# Patient Record
Sex: MALE | Employment: UNEMPLOYED | URBAN - METROPOLITAN AREA
[De-identification: names, ages, dates, MRNs, and addresses within clinical notes are randomized per-mention and may not be internally consistent; named-entity substitution may affect disease eponyms.]

---

## 2023-12-07 ENCOUNTER — APPOINTMENT (OUTPATIENT)
Dept: GENERAL RADIOLOGY | Age: 53
End: 2023-12-07

## 2023-12-07 ENCOUNTER — HOSPITAL ENCOUNTER (OUTPATIENT)
Age: 53
Setting detail: OBSERVATION
Discharge: HOME | End: 2023-12-07
Attending: EMERGENCY MEDICINE | Admitting: SURGERY

## 2023-12-07 VITALS
BODY MASS INDEX: 26.41 KG/M2 | HEART RATE: 55 BPM | TEMPERATURE: 98.8 F | SYSTOLIC BLOOD PRESSURE: 175 MMHG | RESPIRATION RATE: 18 BRPM | HEIGHT: 72 IN | OXYGEN SATURATION: 99 % | WEIGHT: 195 LBS | DIASTOLIC BLOOD PRESSURE: 88 MMHG

## 2023-12-07 DIAGNOSIS — S02.85XA ORBITAL WALL FRACTURE, CLOSED, INITIAL ENCOUNTER (HCC): ICD-10-CM

## 2023-12-07 DIAGNOSIS — S02.85XA CLOSED FRACTURE OF ORBIT, INITIAL ENCOUNTER (HCC): ICD-10-CM

## 2023-12-07 DIAGNOSIS — S02.40CA CLOSED FRACTURE OF RIGHT SIDE OF MAXILLA, INITIAL ENCOUNTER (HCC): ICD-10-CM
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
## 2023-12-07 LAB
ABO + RH BLD: NORMAL
ANION GAP SERPL CALCULATED.3IONS-SCNC: 8 MMOL/L (ref 9–17)
ARM BAND NUMBER: NORMAL
ARTERIAL PATENCY WRIST A: ABNORMAL
BDY SITE: ABNORMAL
BLOOD BANK SAMPLE EXPIRATION: NORMAL
BLOOD BANK SPECIMEN: ABNORMAL
BLOOD GROUP ANTIBODIES SERPL: NEGATIVE
BODY TEMPERATURE: ABNORMAL
BREATHS.MECHANICAL ON VENT: ABNORMAL
BUN SERPL-MCNC: 10 MG/DL (ref 6–20)
CHLORIDE SERPL-SCNC: 105 MMOL/L (ref 98–107)
CO2 SERPL-SCNC: 26 MMOL/L (ref 20–31)
COHGB MFR BLD: ABNORMAL %
CREAT SERPL-MCNC: 1.2 MG/DL (ref 0.7–1.2)
ERYTHROCYTE [DISTWIDTH] IN BLOOD BY AUTOMATED COUNT: 14 % (ref 11.8–14.4)
ETHANOL PERCENT: <0.01 %
ETHANOLAMINE SERPL-MCNC: <10 MG/DL
FIO2 ON VENT: ABNORMAL %
GAS FLOW.O2 O2 DELIVERY SYS: ABNORMAL L/MIN
GAS FLOW.O2 SETTING OXYMISER: ABNORMAL L/MIN
GFR SERPL CREATININE-BSD FRML MDRD: >60 ML/MIN/1.73M2
GLUCOSE SERPL-MCNC: 87 MG/DL (ref 70–99)
HCG SERPL QL: ABNORMAL
HCO3 VENOUS: ABNORMAL MMOL/L (ref 24–30)
HCT VFR BLD AUTO: 42.3 % (ref 40.7–50.3)
HGB BLD-MCNC: 14.4 G/DL (ref 13–17)
HGB BLDMV-MCNC: ABNORMAL G/DL (ref 12–16)
INR PPP: 1.1
MCH RBC QN AUTO: 30 PG (ref 25.2–33.5)
MCHC RBC AUTO-ENTMCNC: 34 G/DL (ref 28.4–34.8)
MCV RBC AUTO: 88.1 FL (ref 82.6–102.9)
METHEMOGLOBIN: ABNORMAL %
NEGATIVE BASE EXCESS, VEN: ABNORMAL MMOL/L (ref 0–2)
NOTIFICATION TIME: ABNORMAL
NOTIFICATION: ABNORMAL
NRBC BLD-RTO: 0 PER 100 WBC
O2 SAT, VEN: ABNORMAL %
OXYHGB MFR BLD: ABNORMAL % (ref 95–98)
PARTIAL THROMBOPLASTIN TIME: 31.2 SEC (ref 23–36.5)
PCO2, VEN, TEMP ADJ: ABNORMAL MMHG (ref 39–55)
PCO2, VEN: ABNORMAL MM HG (ref 39–55)
PEEP RESPIRATORY: ABNORMAL CM[H2O]
PH VENOUS: ABNORMAL (ref 7.32–7.42)
PH, VEN, TEMP ADJ: ABNORMAL (ref 7.32–7.42)
PLATELET # BLD AUTO: 249 K/UL (ref 138–453)
PMV BLD AUTO: 9.1 FL (ref 8.1–13.5)
PO2, VEN, TEMP ADJ: ABNORMAL MMHG (ref 30–50)
PO2, VEN: ABNORMAL MM HG (ref 30–50)
POSITIVE BASE EXCESS, VEN: ABNORMAL MMOL/L (ref 0–2)
POTASSIUM SERPL-SCNC: 4.3 MMOL/L (ref 3.7–5.3)
PROTHROMBIN TIME: 14.2 SEC (ref 11.7–14.9)
PT. POSITION: ABNORMAL
RBC # BLD AUTO: 4.8 M/UL (ref 4.21–5.77)
RESPIRATORY RATE: ABNORMAL
SODIUM SERPL-SCNC: 139 MMOL/L (ref 135–144)
TEXT FOR RESPIRATORY: ABNORMAL
TOTAL RATE: ABNORMAL
VENTILATION MODE VENT: ABNORMAL
VT: ABNORMAL
WBC OTHER # BLD: 8.6 K/UL (ref 3.5–11.3)

## 2023-12-07 PROCEDURE — 80051 ELECTROLYTE PANEL: CPT

## 2023-12-07 PROCEDURE — 99285 EMERGENCY DEPT VISIT HI MDM: CPT

## 2023-12-07 PROCEDURE — 86901 BLOOD TYPING SEROLOGIC RH(D): CPT

## 2023-12-07 PROCEDURE — G0378 HOSPITAL OBSERVATION PER HR: HCPCS

## 2023-12-07 PROCEDURE — 82947 ASSAY GLUCOSE BLOOD QUANT: CPT

## 2023-12-07 PROCEDURE — 85027 COMPLETE CBC AUTOMATED: CPT

## 2023-12-07 PROCEDURE — 84703 CHORIONIC GONADOTROPIN ASSAY: CPT

## 2023-12-07 PROCEDURE — 84520 ASSAY OF UREA NITROGEN: CPT

## 2023-12-07 PROCEDURE — 71045 X-RAY EXAM CHEST 1 VIEW: CPT

## 2023-12-07 PROCEDURE — G0480 DRUG TEST DEF 1-7 CLASSES: HCPCS

## 2023-12-07 PROCEDURE — 2580000003 HC RX 258: Performed by: STUDENT IN AN ORGANIZED HEALTH CARE EDUCATION/TRAINING PROGRAM

## 2023-12-07 PROCEDURE — 6370000000 HC RX 637 (ALT 250 FOR IP): Performed by: STUDENT IN AN ORGANIZED HEALTH CARE EDUCATION/TRAINING PROGRAM

## 2023-12-07 PROCEDURE — 82805 BLOOD GASES W/O2 SATURATION: CPT

## 2023-12-07 PROCEDURE — 85610 PROTHROMBIN TIME: CPT

## 2023-12-07 PROCEDURE — 86900 BLOOD TYPING SEROLOGIC ABO: CPT

## 2023-12-07 PROCEDURE — 85730 THROMBOPLASTIN TIME PARTIAL: CPT

## 2023-12-07 PROCEDURE — 99281 EMR DPT VST MAYX REQ PHY/QHP: CPT | Performed by: SURGERY

## 2023-12-07 PROCEDURE — 82565 ASSAY OF CREATININE: CPT

## 2023-12-07 PROCEDURE — 86850 RBC ANTIBODY SCREEN: CPT

## 2023-12-07 RX ORDER — ACETAMINOPHEN 325 MG/1
650 TABLET ORAL EVERY 8 HOURS SCHEDULED
Status: DISCONTINUED | OUTPATIENT
Start: 2023-12-07 | End: 2023-12-07 | Stop reason: HOSPADM

## 2023-12-07 RX ORDER — ONDANSETRON 2 MG/ML
4 INJECTION INTRAMUSCULAR; INTRAVENOUS EVERY 6 HOURS PRN
Status: DISCONTINUED | OUTPATIENT
Start: 2023-12-07 | End: 2023-12-07 | Stop reason: HOSPADM

## 2023-12-07 RX ORDER — GABAPENTIN 100 MG/1
100 CAPSULE ORAL EVERY 8 HOURS
Status: DISCONTINUED | OUTPATIENT
Start: 2023-12-07 | End: 2023-12-07 | Stop reason: HOSPADM

## 2023-12-07 RX ORDER — SODIUM CHLORIDE 0.9 % (FLUSH) 0.9 %
5-40 SYRINGE (ML) INJECTION EVERY 12 HOURS SCHEDULED
Status: DISCONTINUED | OUTPATIENT
Start: 2023-12-07 | End: 2023-12-07 | Stop reason: HOSPADM

## 2023-12-07 RX ORDER — AMOXICILLIN AND CLAVULANATE POTASSIUM 600; 42.9 MG/5ML; MG/5ML
800 POWDER, FOR SUSPENSION ORAL 2 TIMES DAILY
Status: CANCELLED | OUTPATIENT
Start: 2023-12-07

## 2023-12-07 RX ORDER — SODIUM CHLORIDE, SODIUM LACTATE, POTASSIUM CHLORIDE, AND CALCIUM CHLORIDE .6; .31; .03; .02 G/100ML; G/100ML; G/100ML; G/100ML
1000 INJECTION, SOLUTION INTRAVENOUS ONCE
Status: COMPLETED | OUTPATIENT
Start: 2023-12-07 | End: 2023-12-07

## 2023-12-07 RX ORDER — SODIUM CHLORIDE 0.9 % (FLUSH) 0.9 %
5-40 SYRINGE (ML) INJECTION PRN
Status: DISCONTINUED | OUTPATIENT
Start: 2023-12-07 | End: 2023-12-07 | Stop reason: HOSPADM

## 2023-12-07 RX ORDER — SODIUM CHLORIDE, SODIUM LACTATE, POTASSIUM CHLORIDE, CALCIUM CHLORIDE 600; 310; 30; 20 MG/100ML; MG/100ML; MG/100ML; MG/100ML
INJECTION, SOLUTION INTRAVENOUS CONTINUOUS
Status: DISCONTINUED | OUTPATIENT
Start: 2023-12-07 | End: 2023-12-07 | Stop reason: HOSPADM

## 2023-12-07 RX ORDER — OXYCODONE HYDROCHLORIDE 5 MG/1
5 TABLET ORAL EVERY 6 HOURS PRN
Qty: 12 TABLET | Refills: 0 | Status: SHIPPED | OUTPATIENT
Start: 2023-12-07 | End: 2023-12-10

## 2023-12-07 RX ORDER — ONDANSETRON 4 MG/1
4 TABLET, ORALLY DISINTEGRATING ORAL EVERY 8 HOURS PRN
Status: DISCONTINUED | OUTPATIENT
Start: 2023-12-07 | End: 2023-12-07 | Stop reason: HOSPADM

## 2023-12-07 RX ORDER — SODIUM CHLORIDE 9 MG/ML
INJECTION, SOLUTION INTRAVENOUS PRN
Status: DISCONTINUED | OUTPATIENT
Start: 2023-12-07 | End: 2023-12-07 | Stop reason: HOSPADM

## 2023-12-07 RX ORDER — OXYCODONE HYDROCHLORIDE 5 MG/1
5 TABLET ORAL EVERY 6 HOURS PRN
Status: DISCONTINUED | OUTPATIENT
Start: 2023-12-07 | End: 2023-12-07 | Stop reason: HOSPADM

## 2023-12-07 RX ADMIN — SODIUM CHLORIDE, POTASSIUM CHLORIDE, SODIUM LACTATE AND CALCIUM CHLORIDE 1000 ML: 600; 310; 30; 20 INJECTION, SOLUTION INTRAVENOUS at 16:22

## 2023-12-07 RX ADMIN — OXYCODONE HYDROCHLORIDE 5 MG: 5 TABLET ORAL at 19:49

## 2023-12-07 ASSESSMENT — PAIN SCALES - GENERAL
PAINLEVEL_OUTOF10: 10
PAINLEVEL_OUTOF10: 9
PAINLEVEL_OUTOF10: 8

## 2023-12-07 ASSESSMENT — ENCOUNTER SYMPTOMS
SHORTNESS OF BREATH: 0
BACK PAIN: 0
NAUSEA: 0
ABDOMINAL PAIN: 0
RHINORRHEA: 0
EYE REDNESS: 0
EYE PAIN: 0
DIARRHEA: 0
SORE THROAT: 0
VOMITING: 0
PHOTOPHOBIA: 0

## 2023-12-07 ASSESSMENT — PAIN DESCRIPTION - LOCATION: LOCATION: HEAD

## 2023-12-07 ASSESSMENT — PAIN - FUNCTIONAL ASSESSMENT: PAIN_FUNCTIONAL_ASSESSMENT: 0-10

## 2023-12-07 NOTE — ED NOTES
The following labs were labeled with appropriate pt sticker and tubed to lab:     [x] Blue     [x] Lavender   [] on ice  [x] Green/yellow  [] Green/black [] on ice  [] Tenna Felix  [] on ice  [x] Yellow  [] Red  [x] Pink  [x] Type/ Screen  [] ABG  [] VBG    [] COVID-19 swab    [] Rapid  [] PCR  [] Flu swab  [] Peds Viral Panel     [] Urine Sample  [] Fecal Sample  [] Pelvic Cultures  [] Blood Cultures  [] X 2  [] STREP Cultures  [] Wound Cultures       Palak Elkins RN  12/07/23 5232

## 2023-12-07 NOTE — ED PROVIDER NOTES
[facility]  Emergency Department Encounter  Emergency Medicine Resident     Pt Jesus Win  MRN: [id number]  [address] [facility] 1970  Date of evaluation: 12/7/23  PCP:  No primary care provider on file. Note Started: 2:57 PM EST      CHIEF COMPLAINT       Chief Complaint   Patient presents with    [facility]. Tx. Facial fractures       HISTORY OF PRESENT ILLNESS  (Location/Symptom, Timing/Onset, Context/Setting, Quality, Duration, Modifying Factors, Severity.)      Breonna Marcial is a 47 y.o. male who presents with transfer outside hospital, fall from standing height. Patient had liposuction done yesterday. Has been dizzy however was sent home. Patient got up to go check on his dog when he fell, hit his head on the ground, lost consciousness. Patient was seen at [facility] where he was found to have a right orbital fracture, maxilla fracture. No intracranial bleed. Patient not on anticoagulation. Patient states he has no other past medical history, takes no medications at home. PAST MEDICAL / SURGICAL / SOCIAL / FAMILY HISTORY      has no past medical history on file. has no past surgical history on file.       Social History     Socioeconomic History    Marital status: Single     Spouse name: Not on file    Number of children: Not on file    Years of education: Not on file    Highest education level: Not on file   Occupational History    Not on file   Tobacco Use    Smoking status: Not on file    Smokeless tobacco: Not on file   Substance and Sexual Activity    Alcohol use: Not on file    Drug use: Not on file    Sexual activity: Not on file   Other Topics Concern    Not on file   Social History Narrative    Not on file     Social Determinants of Health     Financial Resource Strain: Not on file   Food Insecurity: Not on file   Transportation Needs: Not on file   Physical Activity: Not on file   Stress: Not on file   Social Connections: Not on file   Intimate Partner

## 2023-12-07 NOTE — CARE COORDINATION
DISCHARGE PLANNING EVALUATION: OP/OBSERVATION        12/7/23, 6:54 PM [facility]         Location: OBS 19/19-1   Reason for hospitalization: Fall, initial encounter [W19. XXXA]     No insurance gave help contact information    Transition plan: home with wife, has transportation

## 2023-12-08 NOTE — PROGRESS NOTES
Trauma Tertiary Survey    Admit Date: 12/7/2023  Hospital day 0      Subjective:     Patient seen and evaluated laying in bed. Patient irate that he is already spoken with the plastic surgery physician who reported that no acute surgical intervention was required and he was uncertain as to why he had to stay. The patient is denying any additional acute complaints. Denying any dizziness. Has been able to ambulate to and from the bathroom without issues. Plastic surgery was contacted via [facility] and reported that the patient's injuries were none operative currently and may be able to be followed up on in the future and that he would be able to be discharged from their perspective and follow-up as an outpatient. Additional recommendations for referral to plastic surgery as well as alternative if patient would wish to follow-up with his plastic surgeon. Patient explained these findings and denied any additional acute complaints and expressed desire to leave the hospital.  [facility] exam performed with findings below. Objective:     Physical Exam  Vitals and nursing note reviewed. Constitutional:       General: He is not in acute distress. Appearance: He is well-developed. HENT:      Head: Normocephalic. Raccoon eyes present. No Logan's sign. Comments: Bilateral periorbital ecchymoses with preseptal hematoma on the patient's right eye     Nose: Nose normal.   Eyes:      Conjunctiva/sclera: Conjunctivae normal.      Pupils: Pupils are equal, round, and reactive to light. Cardiovascular:      Rate and Rhythm: Normal rate and regular rhythm. Pulses:           Radial pulses are 2+ on the right side and 2+ on the left side. Dorsalis pedis pulses are 2+ on the right side and 2+ on the left side. Heart sounds: Normal heart sounds. Pulmonary:      Effort: Pulmonary effort is normal. No respiratory distress. Breath sounds: Normal breath sounds.    Chest:      Chest wall: No tenderness. Abdominal:      General: Bowel sounds are normal.      Palpations: Abdomen is soft. Tenderness: There is no abdominal tenderness. Musculoskeletal:         General: No tenderness. Normal range of motion. Cervical back: No pain with movement, spinous process tenderness or muscular tenderness. Skin:     General: Skin is warm and dry. Findings: Bruising present. Neurological:      Mental Status: He is alert and oriented to person, place, and time. Motor: No abnormal muscle tone. Spine:     Spine Tenderness ROM   Cervical 0 /10 Normal   Thoracic 0 /10 Normal   Lumbar 0 /10 Normal     Musculoskeletal    Joint Tenderness Swelling ROM   Right shoulder absent absent normal   Left shoulder absent absent normal   Right elbow absent absent normal   Left elbow absent absent normal   Right wrist absent absent normal   Left wrist absent absent normal   Right hand grasp absent absent normal   Left hand grasp absent absent normal   Right hip absent absent normal   Left hip absent absent normal   Right knee absent absent normal   Left knee absent absent normal   Right ankle absent absent normal   Left ankle absent absent normal   Right foot absent absent normal   Left foot absent absent normal       CONSULTS: PLASTIC SURGERY    PROCEDURES:      [x] Reviewed radiology reports  (All radiology findings correlate to diagnoses/problem list)    [] Incidental findings:    [] Patient/family notified and letter given    Assessment/Plan:     53-year-old male who experienced a mechanical versus fall from standing height striking face and subsequently evaluated in outside hospital and found to have \"Right superior-laterl orbital fracture which extends into the right anterior right frontal sinus bone. Mildly displaced bilateral nasal bone fracture. Anterior and posterior nondisplaced right maxillary fracture \" and subsequently transferred to [facility] for plastic surgery consultation.

## 2023-12-08 NOTE — DISCHARGE INSTRUCTIONS
You have been evaluated at [address] 12/7/2023 for Nasal Fractures    Your evaluation and treatment included Plastic Surgery Consultation. Your recommendations and if necessary prescriptions from today's visit: Pain medication as prescribed, follow-up w/ Plastic Surgery in 7 to 10 days and take medication as prescribed. Should you have any questions regarding your care or further treatment, please call [facility] Emergency Department at [phone]. NASAL BONE FRACTURES    - Right superior-laterl orbital fracture which extends into the right anterior right frontal sinus bone  - Mildly displaced bilateral nasal bone fracture   - Anterior and posterior nondisplaced right maxillary fracture     *Please follow sinus precautions below, and follow-up with plastic surgery in 7 to 10 days. Sinus Precautions    - DO NOT blow your nose for at least two weeks. - DO NOT forcibly spit for one week. - DO NOT smoke or use smokeless tobacco; smoking greatly inhibits the healing process, especially in the sinuses. - Sneeze with your MOUTH OPEN. If the urge to sneeze arises, do not sneeze through your nose and avoid pinching nostrils. - Drink without a straw for one week. - Avoid swimming for one month and strenuous exercise (e.g. heavy lifting) for one week. - Slight bleeding from the nose is not uncommon and may occur for several days. PLEASE RETURN TO THE ED IF: Any new numbness, weakness, tingling, fever, chills, feeling like you are going to pass out, dizziness, concerns for your health, stroke like symptoms.

## 2023-12-08 NOTE — CONSULTS
[address] [facility] [place], [address] [facility]                                  CONSULTATION    PATIENT NAME: Theodore Graves                      :        1970  MED REC NO:   [id number]                             ROOM:       [id number]  ACCOUNT NO:   [de-identified]                           ADMIT DATE: 2023  PROVIDER:     Nadeem Sanchez    CONSULT DATE:  2023    PLASTIC SURGICAL CONSULTATION    TIME:  07:01 p.m. CHIEF COMPLAINT:  Facial trauma. HISTORY OF PRESENT ILLNESS:  This 50-year-old gentleman has been  transferred from the local [facility] for a facial injury from a  fall from standing height. Apparently, he had liposuction done  yesterday _____ and had been dizzy. However, he was sent home. He said  his dog was barking and he got up to let the dog out and he did fairly  well until he got long distance from his lazy boy chair and does not  remember anything after that. He did not hit anything. He simply hit  the ground. He was taken to the [facility] in [facility] where  he was found to have a right periorbital fracture and a maxilla  fracture. There was no evidence of intracranial bleeding. He was not  on anticoagulation. PAST MEDICAL HISTORY:  Noted in the chart and reviewed. PHYSICAL EXAMINATION:  Examination of this gentleman showed right  periorbital swelling and a Band-Aid across the nasal bone. He has class  I occlusion. He has got periorbital swelling and discomfort in the  periorbital area. He has again class I occlusion. The exam is  otherwise unremarkable. He does wear a binder for his liposuction of  the chest and abdominal area. He did speak in excellent full sentences  and had no other unusual findings. DIAGNOSTIC DATA:  The x-rays do show right superolateral orbital  fracture that extends up to the right anterior frontal sinus bone, which  is nondisplaced.   He has mildly